# Patient Record
Sex: MALE | Race: WHITE
[De-identification: names, ages, dates, MRNs, and addresses within clinical notes are randomized per-mention and may not be internally consistent; named-entity substitution may affect disease eponyms.]

---

## 2018-04-24 ENCOUNTER — HOSPITAL ENCOUNTER (OUTPATIENT)
Dept: HOSPITAL 47 - RADUSWWP | Age: 53
Discharge: HOME | End: 2018-04-24
Payer: COMMERCIAL

## 2018-04-24 DIAGNOSIS — R16.2: Primary | ICD-10-CM

## 2018-04-24 PROCEDURE — 76700 US EXAM ABDOM COMPLETE: CPT

## 2018-04-24 NOTE — US
EXAMINATION TYPE: US abdomen complete

 

DATE OF EXAM: 4/24/2018

 

COMPARISON: NONE

 

CLINICAL HISTORY: R94.5 Abnormal liver function. Patient is diabetic 

 

EXAM MEASUREMENTS:

 

Liver Length:  17.8 cm   

Gallbladder Wall:  0.2 cm   

CBD:  0.4 cm

Spleen:  16.4 cm   

Right Kidney:  13.0 x 6.9 x 5.4 cm 

Left Kidney:  14.0 x 5.8 x 4.7 cm   

 

 

 

Pancreas:  Obscured by bowel gas

Liver:  Coarse, heterogeneous echotexture. Measuring upper limits of normal. Hypoechoic area visualiz
ed adjacent to the gallbladder measuring 1.7 x 1.2 x 1.4 cm. Portal vein appears enlarged.    

Gallbladder:  wnl

**Evidence for sonographic Noland's sign:  No

CBD:  wnl 

Spleen:  Enlarged   

Right Kidney:  No hydronephrosis or masses seen   

Left Kidney:  No hydronephrosis or masses seen   

Upper IVC:  wnl  

Abd Aorta:  Proximal portion obscured by bowel gas, visualized portions wnl

 

 

 

 

 

IMPRESSION: 

1. Hepatosplenomegaly.

2. Fatty liver versus diffuse hepatocellular disease. Probable focal fatty sparing adjacent to the ga
llbladder fossa.